# Patient Record
Sex: FEMALE | Employment: PART TIME | URBAN - METROPOLITAN AREA
[De-identification: names, ages, dates, MRNs, and addresses within clinical notes are randomized per-mention and may not be internally consistent; named-entity substitution may affect disease eponyms.]

---

## 2023-12-06 ENCOUNTER — TELEPHONE (OUTPATIENT)
Dept: FAMILY MEDICINE | Facility: CLINIC | Age: 38
End: 2023-12-06

## 2023-12-06 NOTE — TELEPHONE ENCOUNTER
----- Message from Bridget Matthews MA sent at 12/6/2023  2:37 PM CST -----  Contact: Alex Bush has no openings till January   ----- Message -----  From: Norma Torres LPN  Sent: 12/6/2023  12:39 PM CST  To: Bridget Matthews MA    Patient was calling to make appointment do Dr. Bush have opening or do you want to put with SHAKEEL Rob or AVTAR Galan  ----- Message -----  From: Charisma Orellana  Sent: 12/6/2023  10:37 AM CST  To: Rachelle Khan ([facility]) Staff    Patient is calling to speak with the nurse regarding appt. Reports needing to schedule for checkup Please give patient a call back at .[phone].  Thanks  JL